# Patient Record
(demographics unavailable — no encounter records)

---

## 2024-11-06 NOTE — HISTORY OF PRESENT ILLNESS
[Patient reported PAP Smear was normal] : Patient reported PAP Smear was normal [N] : Patient reports normal menses [IUD] : has an intrauterine device [Y] : Positive pregnancy history [Normal Amount/Duration] :  normal amount and duration [Frequency: Q ___ days] : menstrual periods occur approximately every [unfilled] days [Menarche Age: ____] : age at menarche was [unfilled] [Currently Active] : currently active [Men] : men [No] : No [Yes] : Patient has concerns regarding sex [PapSmeardate] : 2021 [TextBox_31] : as per patient  [LMPDate] : 10/30/2024 [MensesFreq] : 28 [MensesLength] : 7-10 [de-identified] : Nexplanon  [PGxTotal] : 1 [facility] [FreeTextEntry1] : 10/30/2024 [FreeTextEntry3] : NEXPLANON

## 2024-11-06 NOTE — PLAN
[FreeTextEntry1] : Preconception counseling discussed: take prenatal vitamins with at least 400mcg of folic acid daily, utilize OTC ovulation trackers, intercourse q 2-3 days around the time of ovulation. RTO for Nexplanon removal PRN

## 2024-11-06 NOTE — HISTORY OF PRESENT ILLNESS
[Patient reported PAP Smear was normal] : Patient reported PAP Smear was normal [N] : Patient reports normal menses [IUD] : has an intrauterine device [Y] : Positive pregnancy history [Normal Amount/Duration] :  normal amount and duration [Frequency: Q ___ days] : menstrual periods occur approximately every [unfilled] days [Menarche Age: ____] : age at menarche was [unfilled] [Currently Active] : currently active [Men] : men [No] : No [Yes] : Patient has concerns regarding sex [PapSmeardate] : 2021 [TextBox_31] : as per patient  [LMPDate] : 10/30/2024 [MensesFreq] : 28 [MensesLength] : 7-10 [de-identified] : Nexplanon  [PGxTotal] : 1 [facility] [FreeTextEntry1] : 10/30/2024 [FreeTextEntry3] : NEXPLANON